# Patient Record
Sex: MALE | Race: WHITE | Employment: UNEMPLOYED | URBAN - METROPOLITAN AREA
[De-identification: names, ages, dates, MRNs, and addresses within clinical notes are randomized per-mention and may not be internally consistent; named-entity substitution may affect disease eponyms.]

---

## 2021-04-07 ENCOUNTER — HOSPITAL ENCOUNTER (INPATIENT)
Age: 26
LOS: 8 days | Discharge: HOME | DRG: 753 | End: 2021-04-16
Attending: EMERGENCY MEDICINE | Admitting: PSYCHIATRY & NEUROLOGY
Payer: MEDICAID

## 2021-04-07 DIAGNOSIS — R45.851 SUICIDAL IDEATION: Primary | ICD-10-CM

## 2021-04-07 PROCEDURE — 80179 DRUG ASSAY SALICYLATE: CPT

## 2021-04-07 PROCEDURE — 81001 URINALYSIS AUTO W/SCOPE: CPT

## 2021-04-07 PROCEDURE — 80143 DRUG ASSAY ACETAMINOPHEN: CPT

## 2021-04-07 PROCEDURE — 82077 ASSAY SPEC XCP UR&BREATH IA: CPT

## 2021-04-07 PROCEDURE — 80307 DRUG TEST PRSMV CHEM ANLYZR: CPT

## 2021-04-07 PROCEDURE — 80053 COMPREHEN METABOLIC PANEL: CPT

## 2021-04-07 PROCEDURE — 36415 COLL VENOUS BLD VENIPUNCTURE: CPT

## 2021-04-07 PROCEDURE — 85025 COMPLETE CBC W/AUTO DIFF WBC: CPT

## 2021-04-07 PROCEDURE — 99284 EMERGENCY DEPT VISIT MOD MDM: CPT

## 2021-04-07 NOTE — Clinical Note
Status[de-identified] IP BEHAVIORAL MEDICINE [112]   Type of Bed: Behavioral Health Acute [27]   Inpatient Hospitalization Certified Necessary for the Following Reasons: 3. Patient receiving treatment that can only be provided in an inpatient setting (further clarification in H&P documentation)   Admitting Diagnosis: Suicidal ideation [V62.84. ICD-9-CM]   Admitting Physician: Patel Valdez [[id number]]   Attending Physician: Patel Valdez [[id number]]   Estimated Length of Stay: 3-4 Midnights   Discharge Plan[de-identified] Home with Office Follow-up

## 2021-04-08 PROBLEM — F41.8 DEPRESSION WITH ANXIETY: Status: ACTIVE | Noted: 2021-04-08

## 2021-04-08 PROBLEM — R45.851 SUICIDAL IDEATION: Status: ACTIVE | Noted: 2021-04-08

## 2021-04-08 LAB
ALBUMIN SERPL-MCNC: 4.3 G/DL (ref 3.5–5)
ALBUMIN/GLOB SERPL: 1 {RATIO} (ref 1.1–2.2)
ALP SERPL-CCNC: 103 U/L (ref 45–117)
ALT SERPL-CCNC: 48 U/L (ref 12–78)
AMPHET UR QL SCN: NEGATIVE
ANION GAP SERPL CALC-SCNC: 7 MMOL/L (ref 5–15)
APAP SERPL-MCNC: <10 UG/ML (ref 10–30)
APPEARANCE UR: CLEAR
AST SERPL W P-5'-P-CCNC: 18 U/L (ref 15–37)
BACTERIA URNS QL MICRO: NEGATIVE /HPF
BARBITURATES UR QL SCN: NEGATIVE
BASOPHILS # BLD: 0.1 K/UL (ref 0–0.1)
BASOPHILS NFR BLD: 1 % (ref 0–1)
BENZODIAZ UR QL: NEGATIVE
BILIRUB SERPL-MCNC: 0.3 MG/DL (ref 0.2–1)
BILIRUB UR QL: NEGATIVE
BUN SERPL-MCNC: 18 MG/DL (ref 6–20)
BUN/CREAT SERPL: 17 (ref 12–20)
CA-I BLD-MCNC: 9.5 MG/DL (ref 8.5–10.1)
CANNABINOIDS UR QL SCN: NEGATIVE
CHLORIDE SERPL-SCNC: 103 MMOL/L (ref 97–108)
CO2 SERPL-SCNC: 27 MMOL/L (ref 21–32)
COCAINE UR QL SCN: NEGATIVE
COLOR UR: YELLOW
CREAT SERPL-MCNC: 1.06 MG/DL (ref 0.7–1.3)
DATE LAST DOSE: ABNORMAL
DATE LAST DOSE: ABNORMAL
DIFFERENTIAL METHOD BLD: ABNORMAL
DRUG SCRN COMMENT,DRGCM: NORMAL
EOSINOPHIL # BLD: 0.3 K/UL (ref 0–0.4)
EOSINOPHIL NFR BLD: 4 % (ref 0–7)
ERYTHROCYTE [DISTWIDTH] IN BLOOD BY AUTOMATED COUNT: 12.7 % (ref 11.5–14.5)
ETHANOL SERPL-MCNC: <4 MG/DL
GLOBULIN SER CALC-MCNC: 4.1 G/DL (ref 2–4)
GLUCOSE SERPL-MCNC: 101 MG/DL (ref 65–100)
GLUCOSE UR STRIP.AUTO-MCNC: NEGATIVE MG/DL
HCT VFR BLD AUTO: 42.2 % (ref 36.6–50.3)
HGB BLD-MCNC: 14.6 G/DL (ref 12.1–17)
HGB UR QL STRIP: NEGATIVE
IMM GRANULOCYTES # BLD AUTO: 0.1 K/UL (ref 0–0.04)
IMM GRANULOCYTES NFR BLD AUTO: 1 % (ref 0–0.5)
KETONES UR QL STRIP.AUTO: NEGATIVE MG/DL
LEUKOCYTE ESTERASE UR QL STRIP.AUTO: NEGATIVE
LYMPHOCYTES # BLD: 2.5 K/UL (ref 0.8–3.5)
LYMPHOCYTES NFR BLD: 32 % (ref 12–49)
MCH RBC QN AUTO: 31.1 PG (ref 26–34)
MCHC RBC AUTO-ENTMCNC: 34.6 G/DL (ref 30–36.5)
MCV RBC AUTO: 90 FL (ref 80–99)
METHADONE UR QL: NEGATIVE
MONOCYTES # BLD: 0.5 K/UL (ref 0–1)
MONOCYTES NFR BLD: 7 % (ref 5–13)
MUCOUS THREADS URNS QL MICRO: ABNORMAL /LPF
NEUTS SEG # BLD: 4.3 K/UL (ref 1.8–8)
NEUTS SEG NFR BLD: 55 % (ref 32–75)
NITRITE UR QL STRIP.AUTO: NEGATIVE
OPIATES UR QL: NEGATIVE
PCP UR QL: NEGATIVE
PH UR STRIP: 5 [PH] (ref 5–8)
PLATELET # BLD AUTO: 270 K/UL (ref 150–400)
PMV BLD AUTO: 9.9 FL (ref 8.9–12.9)
POTASSIUM SERPL-SCNC: 3.8 MMOL/L (ref 3.5–5.1)
PROT SERPL-MCNC: 8.4 G/DL (ref 6.4–8.2)
PROT UR STRIP-MCNC: NEGATIVE MG/DL
RBC # BLD AUTO: 4.69 M/UL (ref 4.1–5.7)
RBC #/AREA URNS HPF: ABNORMAL /HPF (ref 0–5)
REPORTED DOSE,DOSE: ABNORMAL UNITS
REPORTED DOSE,DOSE: ABNORMAL UNITS
SALICYLATES SERPL-MCNC: <1.7 MG/DL (ref 2.8–20)
SARS-COV-2, COV2: NORMAL
SARS-COV-2, COV2: NOT DETECTED
SODIUM SERPL-SCNC: 137 MMOL/L (ref 136–145)
SP GR UR REFRACTOMETRY: 1.02 (ref 1–1.03)
UA: UC IF INDICATED,UAUC: ABNORMAL
UROBILINOGEN UR QL STRIP.AUTO: 0.1 EU/DL (ref 0.1–1)
WBC # BLD AUTO: 7.7 K/UL (ref 4.1–11.1)
WBC URNS QL MICRO: ABNORMAL /HPF (ref 0–4)

## 2021-04-08 PROCEDURE — 87635 SARS-COV-2 COVID-19 AMP PRB: CPT

## 2021-04-08 PROCEDURE — 65220000003 HC RM SEMIPRIVATE PSYCH

## 2021-04-08 PROCEDURE — 74011250637 HC RX REV CODE- 250/637: Performed by: PSYCHIATRY & NEUROLOGY

## 2021-04-08 RX ORDER — BUSPIRONE HYDROCHLORIDE 7.5 MG/1
7.5 TABLET ORAL 2 TIMES DAILY
Status: ON HOLD | COMMUNITY
End: 2021-04-16 | Stop reason: SDUPTHER

## 2021-04-08 RX ORDER — ACETAMINOPHEN 325 MG/1
650 TABLET ORAL
Status: DISCONTINUED | OUTPATIENT
Start: 2021-04-08 | End: 2021-04-16 | Stop reason: HOSPADM

## 2021-04-08 RX ORDER — ADHESIVE BANDAGE
30 BANDAGE TOPICAL DAILY PRN
Status: CANCELLED | OUTPATIENT
Start: 2021-04-08

## 2021-04-08 RX ORDER — ACETAMINOPHEN 325 MG/1
650 TABLET ORAL
Status: CANCELLED | OUTPATIENT
Start: 2021-04-08

## 2021-04-08 RX ORDER — DIPHENHYDRAMINE HYDROCHLORIDE 50 MG/ML
50 INJECTION, SOLUTION INTRAMUSCULAR; INTRAVENOUS
Status: CANCELLED | OUTPATIENT
Start: 2021-04-08

## 2021-04-08 RX ORDER — ESCITALOPRAM OXALATE 20 MG/1
20 TABLET ORAL DAILY
Status: ON HOLD | COMMUNITY
End: 2021-04-16 | Stop reason: SDUPTHER

## 2021-04-08 RX ORDER — TRAZODONE HYDROCHLORIDE 50 MG/1
50 TABLET ORAL
Status: CANCELLED | OUTPATIENT
Start: 2021-04-08

## 2021-04-08 RX ORDER — TRAZODONE HYDROCHLORIDE 50 MG/1
50 TABLET ORAL
Status: ON HOLD | COMMUNITY
End: 2021-04-16 | Stop reason: SDUPTHER

## 2021-04-08 RX ORDER — MAG HYDROX/ALUMINUM HYD/SIMETH 200-200-20
30 SUSPENSION, ORAL (FINAL DOSE FORM) ORAL
Status: DISCONTINUED | OUTPATIENT
Start: 2021-04-08 | End: 2021-04-16 | Stop reason: HOSPADM

## 2021-04-08 RX ORDER — ARIPIPRAZOLE 2 MG/1
2 TABLET ORAL DAILY
COMMUNITY
End: 2021-04-16

## 2021-04-08 RX ORDER — ESCITALOPRAM OXALATE 10 MG/1
20 TABLET ORAL DAILY
Status: DISCONTINUED | OUTPATIENT
Start: 2021-04-08 | End: 2021-04-16 | Stop reason: HOSPADM

## 2021-04-08 RX ORDER — SODIUM CHLORIDE 9 MG/ML
75 INJECTION, SOLUTION INTRAVENOUS CONTINUOUS
Status: DISCONTINUED | OUTPATIENT
Start: 2021-04-08 | End: 2021-04-08

## 2021-04-08 RX ORDER — HYDROXYZINE 25 MG/1
25 TABLET, FILM COATED ORAL
Status: DISCONTINUED | OUTPATIENT
Start: 2021-04-08 | End: 2021-04-16 | Stop reason: HOSPADM

## 2021-04-08 RX ORDER — BENZTROPINE MESYLATE 1 MG/1
1 TABLET ORAL
Status: CANCELLED | OUTPATIENT
Start: 2021-04-08

## 2021-04-08 RX ORDER — LORAZEPAM 2 MG/ML
1 INJECTION INTRAMUSCULAR
Status: CANCELLED | OUTPATIENT
Start: 2021-04-08

## 2021-04-08 RX ORDER — FLUVOXAMINE MALEATE 50 MG/1
50 TABLET ORAL
Status: DISCONTINUED | OUTPATIENT
Start: 2021-04-08 | End: 2021-04-16 | Stop reason: HOSPADM

## 2021-04-08 RX ORDER — BUSPIRONE HYDROCHLORIDE 5 MG/1
7.5 TABLET ORAL 2 TIMES DAILY
Status: DISCONTINUED | OUTPATIENT
Start: 2021-04-08 | End: 2021-04-16 | Stop reason: HOSPADM

## 2021-04-08 RX ORDER — HYDROXYZINE 50 MG/1
50 TABLET, FILM COATED ORAL
Status: CANCELLED | OUTPATIENT
Start: 2021-04-08

## 2021-04-08 RX ORDER — HALOPERIDOL 5 MG/ML
5 INJECTION INTRAMUSCULAR
Status: CANCELLED | OUTPATIENT
Start: 2021-04-08

## 2021-04-08 RX ORDER — ARIPIPRAZOLE 5 MG/1
5 TABLET ORAL DAILY
Status: DISCONTINUED | OUTPATIENT
Start: 2021-04-08 | End: 2021-04-16 | Stop reason: HOSPADM

## 2021-04-08 RX ORDER — TRAZODONE HYDROCHLORIDE 50 MG/1
50 TABLET ORAL
Status: DISCONTINUED | OUTPATIENT
Start: 2021-04-08 | End: 2021-04-15

## 2021-04-08 RX ORDER — PRAZOSIN HYDROCHLORIDE 1 MG/1
1 CAPSULE ORAL
Status: DISCONTINUED | OUTPATIENT
Start: 2021-04-08 | End: 2021-04-16 | Stop reason: HOSPADM

## 2021-04-08 RX ORDER — OLANZAPINE 5 MG/1
5 TABLET ORAL
Status: CANCELLED | OUTPATIENT
Start: 2021-04-08

## 2021-04-08 RX ADMIN — BUSPIRONE HYDROCHLORIDE 7.5 MG: 5 TABLET ORAL at 11:12

## 2021-04-08 RX ADMIN — ARIPIPRAZOLE 5 MG: 5 TABLET ORAL at 09:00

## 2021-04-08 RX ADMIN — BUSPIRONE HYDROCHLORIDE 7.5 MG: 5 TABLET ORAL at 20:40

## 2021-04-08 RX ADMIN — ESCITALOPRAM OXALATE 20 MG: 10 TABLET ORAL at 11:12

## 2021-04-08 RX ADMIN — TRAZODONE HYDROCHLORIDE 50 MG: 50 TABLET ORAL at 20:40
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
## 2021-04-08 NOTE — GROUP NOTE
[facility] GROUP DOCUMENTATION INDIVIDUAL Group Therapy Note Date: 4/8/2021 Group Start Time: 1300 Group End Time: 1400 Group Topic: Process Group - Inpatient SRM CARE MANAGEMENT Anais Agarwal [facility] GROUP DOCUMENTATION GROUP Group Therapy Note Attendees: 4 out of 13 participants engaged in process group. Patient discussed feelings, emotions and triggers. Patients provided feedback to each other on how to cope and strategies coping skills to prepare for discharge. Attendance: Did not attend Additional Notes:  Patient decline to attend group. Patient was encouraged to discuss and processing feelings during next session. Yanci Mccloud

## 2021-04-08 NOTE — ED TRIAGE NOTES
Pt from private residence presents in [address] [place] [address] custody for mental health crisis. H/o PTSD, Major depression, and generalized anxiety, states he's been having Si with HI and aggression towards family pet, 911 called by family.

## 2021-04-08 NOTE — BH NOTES
Patient was medication compliant, pleasant upon approach, affect flat, poor eye contact. Patient rated depression at a 5 to 6 and anxiety at a 5 on a scale of 0-10. He endorsed SI and rated it at a 4. When questioned about SI he endorsed SI,  it was to \"physically prevent myself from hurting people. \"  His plan includes \"jumping off a bridge on to rocks,\" or \"taking a bunch of pills. \"  When asked about HI he said \"that's why I avoid people. \"  He said \"I don't feel safe at home. \"  AH: \"I hear my dad yelling my name. VH : \"like another part of me wants to deal with the problem. \"  Patient remains isolative. Patient continues on close observation, Q15 minute check.
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
## 2021-04-08 NOTE — GROUP NOTE
IP  GROUP DOCUMENTATION INDIVIDUAL Group Therapy Note Date: 4/8/2021 Group Start Time: 7192 Group End Time: 1878 Group Topic: Recreational/Music Therapy SRM 2  NON ACUTE [facility] GROUP DOCUMENTATION GROUP Group Therapy Note Facilitated leisure skills group focused on positive coping skills through social interactions, group activities, music and art tasks Attendees: 5/13 Attendance: Attended Patient's Goal:  *STG: Attends activities and groups Interventions/techniques: Art integration and Supported Follows Directions: Followed directions Interactions: Interacted appropriately Mental Status: Calm Behavior/appearance: Cooperative Goals Achieved: Able to engage in interactions and Able to listen to others Additional Notes:  Pt was receptive to music and songs he selected in group. Pt was seen interacting with peers and working on leisure packet. Tera Cadet RT Intern

## 2021-04-08 NOTE — PROGRESS NOTES
Problem: Suicide  Goal: *STG: Remains safe in hospital  Outcome: Progressing Towards Goal     Problem: Depressed Mood (Adult/Pediatric)  Goal: *STG: Verbalizes anger, guilt, and other feelings in a constructive manor  Outcome: Progressing Towards Goal  Goal: *STG: Remains safe in hospital  Outcome: Progressing Towards Goal

## 2021-04-08 NOTE — ED NOTES
TRANSFER - OUT REPORT:    Verbal report given to Diana Hernandez (name) on Giovanni Leigh  being transferred to  (unit) for routine progression of care       Report consisted of patients Situation, Background, Assessment and   Recommendations(SBAR). Information from the following report(s) SBAR, ED Summary, [facility] and Recent Results was reviewed with the receiving nurse. Lines:       Opportunity for questions and clarification was provided.       Patient transported with:   Registered Nurse    Security

## 2021-04-08 NOTE — ED NOTES
Pt resting comfortably in ED room 6 in green gown. Room is \"psych safed\" of strangulation hazards. 1:1 at bedside-[facility]. Pt clothing and 4 home medication bottles stored in ED station 1 dictation room with identification label on bag.

## 2021-04-08 NOTE — BH NOTES
[facility] Recreational Therapy Assessment    Orientation:  Person, Place, Date and Situation    Reason for Admission:  Pt reported \"SI and HI \"attacking a pet\" and feeling depressed and anxious\" Pt reported the biggest trigger was is family issues and  \"having a hostile interaction with his Aunt whom he was living with\" Pt reported Heather Brewer is not able to go back with her\"    Medical Precautions / Conditions:  None    Impairments:     Vision:  Wears Glasses Yes,\" but broke them\"      Wears Contacts No      Are they with Patient No     Hearing Aids No     Utilization of Ambulatory Devices:  None    Health Problems Preventing Participation in Activities:  No  How:  n/a    Leisure Interest Checklist:  Cards / Board Games, Listening to Music, Reading, Sports, TV / Movies, [employer], Walking and Writing    Does patient participate in leisure activities:  Sometimes    Setting:  Alone    Other Activities / Skills / Talents:  Pt reported \"detail work and good at working with professional people\"    Do emotions interfere with leisure activities / lifestyles:  Yes    When engaging in leisure activities, do you forget worries:  Yes    Do you belong to a Anglican:   Yes    Are you active in [facility] activities:  No    Typical Day:  Pt reported Heather Brewer is working on taking better care of himself, playing games on his phone and surfing the internet\"    Strengths:  Verbal Expression, Social Support and reported \"he was stable on medication for a week\"    Limitations / Barriers:  Family Support, Finances, Transportation, Housing, Depressed Mood, Anxiety and Anger / Aggression    Treatment Modalities:  Stress Management, Coping Skills, Symptom Recognition, Healthy Thinking, Mood Management and Leisure Skills    Patient Educational  Needs:  Skills to recognize and challenge problematic thinking, Identify positive coping strategies and skills to manage symptoms or moods, Leisure education and Recognition of symptoms and signs    Focus of Treatment: Introduce positive outlets for self expression and Introduce and encourage the exploration of alternative coping skills    Summary:  Pt was cooperative during assessment. Pt reported he was staying with his Aunt but is not able to go back there and so he is planning to go to a group home. Pt reported he is not working and only receives [employer]. Pt reported he see a Psychiatrist at [facility], see a Therapist and a  at [address] [place] [address] in [place]. Pt reported he also see a .  Pt reported his goal is \"to get stable on medication and have stable housing \"

## 2021-04-08 NOTE — GROUP NOTE
[facility] GROUP DOCUMENTATION INDIVIDUAL Group Therapy Note Date: 4/8/2021 Group Start Time: 1100 Group End Time: 3826 Group Topic: Education Group - Inpatient [facility] Group Grettariya Reyesarmida [facility] GROUP DOCUMENTATION GROUP Group Therapy Note Attendees: All pts were encouraged to attend but only 7 out of 12 attended. The topic was trauma and the common responses to it. The pts were given two worksheets and asked to introduce themselves as well as state something positive that has happened to them recently. They were then given a trigger warning and told to leave if they needed to. They were asked to give examples of trauma and their own feelings or reactions to a traumatic event. We went through the common reactions and treatment together and they were then asked to list healthy and unhealthy coping skills for trauma. In the end they were asked to reflect on the group and how they felt. Attendance: Did not attend Ky Smith
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
## 2021-04-09 PROCEDURE — 74011250637 HC RX REV CODE- 250/637: Performed by: PSYCHIATRY & NEUROLOGY

## 2021-04-09 PROCEDURE — 65220000003 HC RM SEMIPRIVATE PSYCH

## 2021-04-09 RX ADMIN — FLUVOXAMINE MALEATE 50 MG: 50 TABLET, COATED ORAL at 20:49

## 2021-04-09 RX ADMIN — ESCITALOPRAM OXALATE 20 MG: 10 TABLET ORAL at 08:44

## 2021-04-09 RX ADMIN — ARIPIPRAZOLE 5 MG: 5 TABLET ORAL at 08:44

## 2021-04-09 RX ADMIN — PRAZOSIN HYDROCHLORIDE 1 MG: 1 CAPSULE ORAL at 00:27

## 2021-04-09 RX ADMIN — BUSPIRONE HYDROCHLORIDE 7.5 MG: 5 TABLET ORAL at 20:47

## 2021-04-09 RX ADMIN — TRAZODONE HYDROCHLORIDE 50 MG: 50 TABLET ORAL at 20:47

## 2021-04-09 RX ADMIN — BUSPIRONE HYDROCHLORIDE 7.5 MG: 5 TABLET ORAL at 08:42

## 2021-04-09 RX ADMIN — FLUVOXAMINE MALEATE 50 MG: 50 TABLET, COATED ORAL at 00:27

## 2021-04-09 RX ADMIN — PRAZOSIN HYDROCHLORIDE 1 MG: 1 CAPSULE ORAL at 20:50
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
## 2021-04-09 NOTE — GROUP NOTE
[facility] GROUP DOCUMENTATION INDIVIDUAL Group Therapy Note Date: 4/9/2021 Group Start Time: 3660 Group End Time: 7448 Group Topic: Recreational/Music Therapy SRM 2  NON ACUTE Rupali Pederson [facility] GROUP DOCUMENTATION GROUP Group Therapy Note Facilitated leisure skills group focused on positive coping skills through social interactions, group activities, music and art tasks Attendees: 7/14 Attendance: Attended Patient's Goal:  *STG: Attends activities and groups Interventions/techniques: Art integration and Supported Follows Directions: Followed directions Interactions: Interacted appropriately Mental Status: Calm and Flat Behavior/appearance: Cooperative Goals Achieved: Able to engage in interactions and Able to listen to others Additional Notes:  Pt was receptive to music and song he selected while in group. Pt was seen working on leisure packet and interacting with peers. Antionette Sever, RT Intern
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
## 2021-04-09 NOTE — CONSULTS
Consult    Subjective:     Patient is a 26y.o. year old male PMH anxiety, depression, and PTSD voluntarily admitted for suicidal and homicidal ideations. Medical services consulted by psychiatry upon admission. The patient stopped taking his medications and was asked to leave by his family. He lashed out and strangled the dog. He has previously attempted suicide by overdose and self-harm gestures with knives. Admission labs were unremarkable and his drug screen was negative. He denies any chronic or acute medical problems. On exam he only complains of a slight headache after restarting his medications and is satisfied with Tylenol. Past Medical History:   Diagnosis Date    Anxiety with depression     Depression     Psychiatric disorder     PTSD      History reviewed. No pertinent surgical history. History reviewed. No pertinent family history.    Social History     Tobacco Use    Smoking status: Not on file   Substance Use Topics    Alcohol use: Not on file       Current Facility-Administered Medications   Medication Dose Route Frequency Provider Last Rate Last Admin    traZODone (DESYREL) tablet 50 mg  50 mg Oral QHS PRN Page Menendez MD   50 mg at 04/08/21 2040    acetaminophen (TYLENOL) tablet 650 mg  650 mg Oral Q4H PRN Tre Clinton MD        ARIPiprazole (ABILIFY) tablet 5 mg  5 mg Oral DAILY Page Menendez MD   5 mg at 04/09/21 0844    hydrOXYzine HCL (ATARAX) tablet 25 mg  25 mg Oral Q4H PRN Tre Clinton MD        alum-mag hydroxide-simeth (MYLANTA) oral suspension 30 mL  30 mL Oral Q4H PRN Jose Alfredo Clinton MD        busPIRone (BUSPAR) tablet 7.5 mg  7.5 mg Oral BID Page Menendez MD   7.5 mg at 04/09/21 0842    escitalopram oxalate (LEXAPRO) tablet 20 mg  20 mg Oral DAILY Page Menendez MD   20 mg at 04/09/21 0844    prazosin (MINIPRESS) capsule 1 mg  1 mg Oral QHS Jose Alfredo Clinton MD   1 mg at 04/09/21 0027    fluvoxaMINE (LUVOX) tablet 50 mg  50 mg Oral QHS Ysabel Clayton MD   50 mg at 04/09/21 9957        No Known Allergies     Review of Systems:  Constitutional: Negative for chills and fever. HENT: Negative. Eyes: Negative. Respiratory: Negative. Cardiovascular: Negative. Gastrointestinal: Negative for abdominal pain and nausea. Skin: Negative. Neurological: Negative. Objective: Intake and Output:    No intake/output data recorded. No intake/output data recorded. Physical Exam:   Constitutional: pt is oriented to person, place, and time. Head: Normocephalic and atraumatic. Eyes: Pupils are equal, round, and reactive to light. EOM are normal.   Cardiovascular: Normal rate, regular rhythm and normal heart sounds. Pulmonary/Chest: Breath sounds normal. No wheezes. No rales. Exhibits no tenderness. Abdominal: Soft. Bowel sounds are normal. There is no abdominal tenderness. There is no rebound and no guarding. Musculoskeletal: Normal range of motion. Neurological: pt is alert and oriented to person, place, and time. Normal strength. No cranial nerve deficit or sensory deficit. Data Review:   No results found for this or any previous visit (from the past 24 hour(s)). No orders to display        Assessment:     Active Problems:    Suicidal ideation (4/8/2021)      Depression with anxiety (4/8/2021)    Bipolar disorder - mixed  PTSD      Plan:   Admitted to [address] [place] [address]  On Abilify, buspar, lexapro, luvox, and minipress  Continue prn tylenol for headache  Continue prn medications for sleep and anxiety  Continue close observation and suicide precautions    Appreciate consultation. Medical services remain available as needed.     Discussed with Dr. William Shultz    No DVT PPX necessary as patient is ambulatory with no risk factors    Full code this admission

## 2021-04-09 NOTE — BH NOTES
Client vomited once during the shift and told me he has  Indigestion. Mylanta given along with a vistari. Client verbalized having some relief. Amaris Blount

## 2021-04-09 NOTE — BH NOTES
Patient isolative and flat, never came out of room, just sleeping. No distress noted, respirations unlabored. Will continue to monitor patient every 15 mins as per unit protocol.

## 2021-04-09 NOTE — BH NOTES
Client is quietly active on the unit. Appearance is semi-tidy. Client has a good appetite and reports sleeping well. Denies feeling suicidal or homicidal.Client did attend some of the morning groups. Takes meds as prescribed and denies any side effects. Remains on close observation for safety.

## 2021-04-09 NOTE — GROUP NOTE
[facility] GROUP DOCUMENTATION INDIVIDUAL Group Therapy Note Date: 4/9/2021 Group Start Time: 1100 Group End Time: 4935 Group Topic: Education Group - Inpatient [address] [facility] Tessie Meza [facility] GROUP DOCUMENTATION GROUP Group Therapy Note Attendees: All pts were encouraged to attend but only 10 out of 14 attended. The topic was mindfulness so the group did a 10 minute guided meditation. They were asked to introduce themselves and to state their current mood. They were then asked if they had meditated before and if they knew what mediation was. They were then asked if it was helpful in the past. After the mediation they were asked if it was helpful and if they had any questions about mindfulness and meditation. Attendance: Did not attend Ky Smith

## 2021-04-09 NOTE — BH NOTES
RELEASE OF INFORMATION    The patient signed a consent form for his aunt Michelle Torres to be contacted ([phone]).

## 2021-04-09 NOTE — GROUP NOTE
[facility]  GROUP DOCUMENTATION INDIVIDUAL Group Therapy Note Date: 4/9/2021 Group Start Time: 1300 Group End Time: 4286 Group Topic: Process Group - Inpatient [facility] Group Haydee GARRIDO  GROUP DOCUMENTATION GROUP Group Therapy Note Attendees: 4/14 Process: Pts were asked something they want to learn as a check in question. Pts were then encouraged to share what brought them to the hospital. Pts were encouraged to provide feedback to one another. Pts were then walked through a breathing exercise and asked to reflect on group Attendance: Did not attend Additional Notes:  Pt was encouraged to attend but chose not to do so [employer]

## 2021-04-10 PROCEDURE — 74011250637 HC RX REV CODE- 250/637: Performed by: PSYCHIATRY & NEUROLOGY

## 2021-04-10 PROCEDURE — 65220000003 HC RM SEMIPRIVATE PSYCH

## 2021-04-10 RX ADMIN — PRAZOSIN HYDROCHLORIDE 1 MG: 1 CAPSULE ORAL at 21:26

## 2021-04-10 RX ADMIN — ARIPIPRAZOLE 5 MG: 5 TABLET ORAL at 09:12

## 2021-04-10 RX ADMIN — FLUVOXAMINE MALEATE 50 MG: 50 TABLET, COATED ORAL at 21:27

## 2021-04-10 RX ADMIN — ESCITALOPRAM OXALATE 20 MG: 10 TABLET ORAL at 09:12

## 2021-04-10 RX ADMIN — BUSPIRONE HYDROCHLORIDE 7.5 MG: 5 TABLET ORAL at 09:12

## 2021-04-10 RX ADMIN — BUSPIRONE HYDROCHLORIDE 7.5 MG: 5 TABLET ORAL at 21:26

## 2021-04-10 NOTE — BH NOTES
Patient has been visible on the unit. Patient went to bed early to night. He medication compliant. His affect is flat, but he animated. Preoccupied. He denies depression, SI, HI, AH & VH, his anxiety is a 5/10. He remains on close observation for safety. No violent behavior observed.  Patient stated that he is staying in his room, to stay clam.

## 2021-04-10 NOTE — BH NOTES
Patient case discussed in the treatment team patient seen he says he slept well energy motivation improved no more suicidal thoughts no more homicidal thoughts no more thought to harm mom shooting etc.  Says medications working says he got some call from [employer] about his disability application come not made any aggressive moves he says he definitely needs some housing

## 2021-04-10 NOTE — GROUP NOTE
[facility]  GROUP DOCUMENTATION INDIVIDUAL Group Therapy Note Date: 4/10/2021 Group Start Time: 1922 Group End Time: 0992 Group Topic: Nursing SRM 2 [facility] Ann Shafer LPN IP  GROUP DOCUMENTATION GROUP Group Therapy Note Attendees: 5/14 Attendance: Did not attend Patient's Goal: id benefits of medications Interventions/techniques: Follows Directions:  
 
Interactions:  
 
Mental Status:  
 
Behavior/appearance:  
 
Goals Achieved: Additional Notes:  Pt. Invited did not attend Carol Bosch LPN

## 2021-04-10 NOTE — BH NOTES
Behavioral Health Treatment Team Note     Patient goal(s) for today: 'find out more about group homes'  Treatment team focus/goals: medication management, group therapy     Progress note: Pt presented with a flat affect and depressed mood. Pt stated that he felt better and that he had been sleeping a lot better. Pt stated that his anxiety was high due to not having his phone to play games that help him to calm down. Pt denies any SI/HI/AVH, stated depression is a 4/10 at this time. Pt was very flat and did not provide many answers. Pt stated that he was ready to go home. Pt in need of inpatient level of care to allow for dc planning. LOS:  2  Expected LOS: 5-7    Insurance info/prescription coverage:  [employer]  Date of last family contact:  4/9.21  Family requesting physician contact today:  no  Discharge plan:  Step down to a crisis stabilization program   Guns in the home:  no   Outpatient provider(s):  Sees a therapist and psychiatrist through [facility] [address]. Recently started seeing a .      Participating treatment team members: Grayson Mtz, * (assigned SW), Alicia Addison

## 2021-04-10 NOTE — PROGRESS NOTES
Problem: Suicide  Goal: *STG: Remains safe in hospital  Outcome: Progressing Towards Goal  Goal: *STG: Attends activities and groups  Outcome: Progressing Towards Goal  Goal: *STG/LTG: Complies with medication therapy  Outcome: Progressing Towards Goal     Problem: Depressed Mood (Adult/Pediatric)  Goal: *STG: Participates in treatment plan  Outcome: Progressing Towards Goal  Pt has isolated in his room resting in bed with eyes open most of AM, as the shift progressed pt appropriately socialized with select peers in the dayroom. Pt did attend some groups as scheduled. Pt denied thoughts of self harm, none noted. Pt without noted or noted anger issues. Pt accepted and tolerated meds as ordered. Will continue to monitor pt on closely and follow treatment plan. Pt encouraged to seek staff as needed. Pt requested and rec'd Mylanta 30cc po for indigestion at 1629, pt reported relief at 1725.

## 2021-04-10 NOTE — GROUP NOTE
[facility] GROUP DOCUMENTATION INDIVIDUAL Group Therapy Note Date: 4/10/2021 Group Start Time: 7852 Group End Time: 2917 Group Topic: Education Group - Inpatient SRM 2  NON ACUTE Germaine Kan 
 
[facility] GROUP DOCUMENTATION GROUP Group Therapy Note Facilitated discussion focused on identifying feelings and their triggers and the changes that need to be made to experience more comfortable feelings and less uncomfortable feelings Attendees: 7 out of 14 Attendance: Attended Patient's Goal:  *STG: Verbalizes anger, guilt, and other feelings in a constructive manor Interventions/techniques: Informed and Supported Follows Directions: Followed directions Interactions: Interacted appropriately Mental Status: Calm Behavior/appearance: Cooperative Goals Achieved: Able to engage in interactions, Able to listen to others, Able to self-disclose, Discussed coping, Identified feelings and Identified triggers Additional Notes:  Pt was receptive to information discussed and shared he experienced feeling anger, depressed  and calm and it's triggers was being yelled at, feeling disregarded and  financial stress. Pt reported he was feeling calm today because he made a friend, on stable meds, good sleep, and God/Rastafari. Pt reported having his own place,a good job, drivers license, a car, feeling safe outside and being able to build a lasting friendship and relationship will help him to experience more comfortable feelings ELIZABETH Fuentes

## 2021-04-10 NOTE — GROUP NOTE
[facility] GROUP DOCUMENTATION INDIVIDUAL Group Therapy Note Date: 4/10/2021 Group Start Time: 1100 Group End Time: 1520 Group Topic: Process Group - Inpatient SRM 2  NON ACUTE Glennelvi Dawson [facility] GROUP DOCUMENTATION GROUP Group Therapy Note Attendees: 10 out of 14 
 
11am Process Group Patients were invited to group and encouraged to share their thoughts, feelings, and emotions. Patients were asked to share their goal for the day with the group. Patients were also asked what they feel needs to change in their lives in order to improve their mental health. Lastly, the patients were asked to listen respectfully to and be supportive of their peers. Attendance: Attended Patient's Goal:  Develop coping skills Interventions/techniques: Validated, Promoted peer support, Provide feedback and Supported Follows Directions: Followed directions Interactions: Interacted appropriately Mental Status: Calm and Flat Behavior/appearance: Attentive, Cooperative and Motivated Goals Achieved: Able to engage in interactions, Able to listen to others, Able to reflect/comment on own behavior, Able to manage/cope with feelings, Able to receive feedback, Able to experience relief/decrease in symptoms, Able to self-disclose, Displayed empathy, Identified feelings, Identified triggers and Verbalized increased hopefulness Additional Notes:  Pt stated that his goal for today is \"staying on my medications and going to groups. \" Pt also spoke about needing to find a new place to live as he feels that living with family is negatively effecting his mental health. Pt stated that he might try to find housing in a group home or rooming house. Pt was calm and appropriate during group. [place]

## 2021-04-11 PROCEDURE — 74011250637 HC RX REV CODE- 250/637: Performed by: PSYCHIATRY & NEUROLOGY

## 2021-04-11 PROCEDURE — 65220000003 HC RM SEMIPRIVATE PSYCH

## 2021-04-11 RX ADMIN — FLUVOXAMINE MALEATE 50 MG: 50 TABLET, COATED ORAL at 21:38

## 2021-04-11 RX ADMIN — PRAZOSIN HYDROCHLORIDE 1 MG: 1 CAPSULE ORAL at 21:38

## 2021-04-11 RX ADMIN — TRAZODONE HYDROCHLORIDE 50 MG: 50 TABLET ORAL at 21:39

## 2021-04-11 RX ADMIN — BUSPIRONE HYDROCHLORIDE 7.5 MG: 5 TABLET ORAL at 21:38

## 2021-04-11 RX ADMIN — BUSPIRONE HYDROCHLORIDE 7.5 MG: 5 TABLET ORAL at 08:38

## 2021-04-11 RX ADMIN — ESCITALOPRAM OXALATE 20 MG: 10 TABLET ORAL at 08:39

## 2021-04-11 RX ADMIN — ARIPIPRAZOLE 5 MG: 5 TABLET ORAL at 08:39

## 2021-04-11 NOTE — BH NOTES
Pt up and out of room on time for breakfast.  Pt medication compliant and no negative effects from medications observed or reported. Affect observed as flat situational brightening on approach. Observed as calm and cooperative. Pt eating 100% of meals offered. No management issues on the unit. Pt reported no depression or suicidal ideation. Continue 15 minute checks to maintain pt safety.

## 2021-04-11 NOTE — GROUP NOTE
[facility] GROUP DOCUMENTATION INDIVIDUAL Group Therapy Note Date: 4/11/2021 Group Start Time: 1100 Group End Time: 1582 Group Topic: Process Group - Inpatient SRM 2 BH NON ACUTE Marietta Krishna [facility] GROUP DOCUMENTATION GROUP Group Therapy Note Attendees: 10 out of 14 Patients were invited to group and encouraged to share their thoughts, feelings, and emotions. Patients were asked to share their goals for the day with the group as well. Patients were also asked to speak about the coping skills that they have gained while hospitalized. Lastly, patients were encouraged to listen respectfully to and be supportive of their peers. Attendance: Did not attend Patient's Goal:  N/A Interventions/techniques: Other N/A Follows Directions: Other N/A Interactions: Other N/A Mental Status: Other N/A Behavior/appearance: N/A Goals Achieved: N/A Additional Notes:  Pt did not attend group despite having been encouraged to do so. [place]

## 2021-04-11 NOTE — BH NOTES
Patient has been visible on the unit, socializing with peers. He animated. No violate behavior observed. His depression, anxiety 3/10. He denies SI, HI, AH & VH. He is medication complaint. He remains on close observation for safety.

## 2021-04-11 NOTE — GROUP NOTE
[facility]  GROUP DOCUMENTATION INDIVIDUAL Group Therapy Note Date: 4/11/2021 Group Start Time: 1 Group End Time: 3098 Group Topic: Nursing SRM 2 BEHA TH ACUTE Ann Shafer LPN IP  GROUP DOCUMENTATION GROUP Group Therapy Note Attendees: 10/14 Attendance: Did not attend Patient's Goal: id facts & Myths about mental illness Interventions/techniques: Follows Directions:  
 
Interactions:  
 
Mental Status:  
 
Behavior/appearance:  
 
Goals Achieved: Additional Notes:  Pt. Invited did not attend Kaylene Jain LPN
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
## 2021-04-12 PROCEDURE — 65220000003 HC RM SEMIPRIVATE PSYCH

## 2021-04-12 PROCEDURE — 74011250637 HC RX REV CODE- 250/637: Performed by: PSYCHIATRY & NEUROLOGY

## 2021-04-12 RX ADMIN — FLUVOXAMINE MALEATE 50 MG: 50 TABLET, COATED ORAL at 21:12

## 2021-04-12 RX ADMIN — BUSPIRONE HYDROCHLORIDE 7.5 MG: 5 TABLET ORAL at 21:12

## 2021-04-12 RX ADMIN — BUSPIRONE HYDROCHLORIDE 7.5 MG: 5 TABLET ORAL at 08:30

## 2021-04-12 RX ADMIN — ESCITALOPRAM OXALATE 20 MG: 10 TABLET ORAL at 08:31

## 2021-04-12 RX ADMIN — PRAZOSIN HYDROCHLORIDE 1 MG: 1 CAPSULE ORAL at 21:12

## 2021-04-12 RX ADMIN — ARIPIPRAZOLE 5 MG: 5 TABLET ORAL at 08:30

## 2021-04-12 NOTE — BH NOTES
Patient pleasant upon approach, isolative, no peer interaction noted. Patient was medication compliant. Patient stated he was better than when he came in. He rated depression at a 2 and anxiety at a 3 on a scale of 0-10. Patient denied SI, HI, AH, and VH. Patient remains on close observation, Q 15 minute checks.

## 2021-04-12 NOTE — BH NOTES
Behavioral Health Treatment Team Note     Patient goal(s) for today: 'find out more information about going to group homes'  Treatment team focus/goals: dc planning, medication management, group therapy     Progress note: Pt presented with a flat affect and depressed mood. Pt enquired about group homes and stated that he feels that would be the best placement for him. Pt stated that he was 'about ready to leave' and that he felt that he was on the right medications. Pt stated that he was sleeping well and that he would like to continue taking the trazadone. Pt reported that his anxiety and depression were a 3/10. Pt denies any SI/HI/AVH at this time. Pt in need of inpatient level of care to allow time for service coordination. LOS:  4  Expected LOS: 5-7    Insurance info/prescription coverage:  [employer]  Date of last family contact:  4/9.21  Family requesting physician contact today:  no  Discharge plan:  Step down to a CSU   Guns in the home:  no   Outpatient provider(s):  Sees a therapist and psychiatrist through [facility] [address].  Recently started seeing a .   Participating treatment team members: Lidia Bowling, * (assigned SW), Merlene Lucas

## 2021-04-12 NOTE — PROGRESS NOTES
Discussed case interviewed patient  Taking and tolerating medications well  Decrease anxiety and depressive symptoms  Interacts with peers and staff  Attends groups  His mood remains depressed and flat  Denies suicidal ideations denies hearing voices  Prefers to keep to himself most times  No overt aggression  Eating and sleeping okay    Mental status exam  Alert oriented cooperative  Speech is goal-directed soft tone  Mood is depressed anxious  Thought process and illogical  Insight and judgment fair    Recommendations  Continue inpatient treatments  Medication regimen reviewed  Follow-up with psychiatric team again tomorrow
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
## 2021-04-12 NOTE — GROUP NOTE
[facility] GROUP DOCUMENTATION INDIVIDUAL Group Therapy Note Date: 4/12/2021 Group Start Time: 1100 Group End Time: 5714 Group Topic: Process Group - Inpatient SRM CARE MANAGEMENT Yuniel Haider LCSW [facility] GROUP DOCUMENTATION GROUP Group Therapy Note Pts participated in group therapy. Pts were asked a check in question about how they were feeling and what is something that they are looking forwards too. Pts then were asked to discuss and share thoughts feelings, and emotions that they may be feeling. Pts were encouraged to share with each other and provide feedback. Pts ended group by sharing one positive thing. Attendees: 13/15 Attendance: Attended Patient's Goal:  Pt working towards goal of attending group therapy Interventions/techniques: Validated, Promoted peer support and Provide feedback Follows Directions: Followed directions Interactions: Interacted appropriately Mental Status: Congruent Behavior/appearance: Attentive and Cooperative Goals Achieved: Able to engage in interactions, Able to listen to others, Able to give feedback to another and Able to reflect/comment on own behavior Additional Notes:  Pt participate in group therapy. Pt stated that he was excited about dc and ready to leave. Pt gave feedback to peers and discussed that he felt that he was put on the back burner in his family and that his needs were not met. Pt stated that one positive thing that had happened to him was that he was given his own space to retreat too when the unit was getting loud. Linda Cross LCSW

## 2021-04-12 NOTE — BH NOTES
The pt is resting quietly in bed with his eyes closed. No distress noted or voiced. Respirations remain quiet and unlabored. He remains on close observation with every 15 minute rounding for safety. At the start of the evening shift, the pt was sitting in the milieu. Pt noted quietly sitting, but not interacting with peers, while watching T.V.  He ambulated in the ocampo prior to going to sleep. The pt is med compliant and requested prn Trazodone for sleep. The pt stated that he did not sleep well the previous night since he did not get the Trazodone. The pt would like to have the Trazodone scheduled. He has a flat affect and rated his anxiety a 2/10 and his depression a 3/10. The pt denies having any SI, HI, or A/VH and no gestures noted. He is cooperative and pleasant. The pt is quiet and keeps to himself. He has  accepted snacks and something to drink. No c/o pain or discomfort. VSS.

## 2021-04-12 NOTE — PROGRESS NOTES
Problem: Falls - Risk of  Goal: *Absence of Falls  Description: Document Dandre Rutledge Fall Risk and appropriate interventions in the flowsheet. Outcome: Progressing Towards Goal  Note: Fall Risk Interventions:     Pt ambulates independently and without difficulty. No fall reported and none noted. Problem: Suicide  Goal: *STG: Remains safe in hospital  Outcome: Progressing Towards Goal     Pt remains safe while in the hospital. He denies having any suicidal thoughts and no gestures noted. Problem: Suicide  Goal: *STG/LTG: Complies with medication therapy  Outcome: Progressing Towards Goal      Pt is med compliant. Problem: Suicide  Goal: *STG/LTG: No longer expresses self destructive or suicidal thoughts  Outcome: Progressing Towards Goal   No self destructive or suicidal thoughts voiced and no gestures noted. Problem: Anxiety  Goal: *Alleviation of anxiety  Outcome: Progressing Towards Goal   Pt rated his anxiety a 2/10. He appears quiet / calm and cooperative.

## 2021-04-13 PROCEDURE — 74011250637 HC RX REV CODE- 250/637: Performed by: PSYCHIATRY & NEUROLOGY

## 2021-04-13 PROCEDURE — 65220000003 HC RM SEMIPRIVATE PSYCH

## 2021-04-13 RX ADMIN — ARIPIPRAZOLE 5 MG: 5 TABLET ORAL at 08:37

## 2021-04-13 RX ADMIN — TRAZODONE HYDROCHLORIDE 50 MG: 50 TABLET ORAL at 21:02

## 2021-04-13 RX ADMIN — FLUVOXAMINE MALEATE 50 MG: 50 TABLET, COATED ORAL at 21:01

## 2021-04-13 RX ADMIN — ESCITALOPRAM OXALATE 20 MG: 10 TABLET ORAL at 08:37

## 2021-04-13 RX ADMIN — BUSPIRONE HYDROCHLORIDE 7.5 MG: 5 TABLET ORAL at 08:37

## 2021-04-13 RX ADMIN — PRAZOSIN HYDROCHLORIDE 1 MG: 1 CAPSULE ORAL at 21:02

## 2021-04-13 RX ADMIN — BUSPIRONE HYDROCHLORIDE 7.5 MG: 5 TABLET ORAL at 21:01

## 2021-04-13 NOTE — GROUP NOTE
HEMA  GROUP DOCUMENTATION INDIVIDUAL Group Therapy Note Date: 4/13/2021 Group Start Time: 0284 Group End Time: 1930 Group Topic: Special Track - Drug Topic SRM 2 BEHA HLTH ACUTE Nguyen Mariajose, RT 
 
[facility] GROUP DOCUMENTATION GROUP Group Therapy Note Attendees: 10 [employer] Attendance: Attended Patient's Goal:  To attend groups Interventions/techniques: Informed Follows Directions: Followed directions Interactions: Interacted appropriately Mental Status: Calm Behavior/appearance: Attentive and Cooperative Goals Achieved: Able to listen to others and Able to receive feedback Additional Notes:  Pt attended group and was engaged.  
 
Rocky New, RT

## 2021-04-13 NOTE — BH NOTES
Patient case discussed in the treatment team patient seen patient is in bed calm polite pleasant oriented to 3 spheres and denied suicidal thought denied homicidal thought and he is very much preoccupied with the housing group home and getting Social Security but he did talk to his aunt but felt that she told him that she cannot afford to have him home. Affect remains flat no agitation no aggression.   Compliant with medication and attending the groups continued inpatient level of care indicated vital signs today temperature 97.8 respiration 18 pulse 80 blood pressure 106/64 and this is a progress note for April 12, 2021 thank you

## 2021-04-13 NOTE — GROUP NOTE
IP  GROUP DOCUMENTATION INDIVIDUAL Group Therapy Note Date: 4/12/2021 Group Start Time: 2015 Group End Time: 2100 Group Topic: Recreational/Music Therapy SRM 2  NON ACUTE Rubia Fermin [facility] GROUP DOCUMENTATION GROUP Group Therapy Note Attendees: 10 Introduced healthy leisure task as positive way to cope and manage mood Attendance: Attended Patient's Goal:STG: Attends activities and groups Interventions/techniques: Art integration and Supported Follows Directions: Followed directions Interactions: Interacted appropriately Mental Status: Calm and Flat Behavior/appearance: Attentive and Cooperative Goals Achieved: Able to engage in interactions and Able to listen to others Additional Notes: Attended group and actively participated. Selected songs to listen to in group. Pt received art task and puzzles to complete. Was receptive to intervention. Used leisure time constructively.  
 
Dinesh Ramey, CTRS
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
## 2021-04-13 NOTE — BH NOTES
Patient ambulating in the halls at times social with peers. Affect flat to constricted with minimal eye contact. He was medication compliant. 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 He assured writer that the case management was aware. Writer spoke with  and he is to have a full psychological evaluation by Dr. Stefani Barron. Patient denied SI, HI, AH, VH, depression and anxiety. He remains on close observation, Q 15 minute checks.

## 2021-04-13 NOTE — GROUP NOTE
[facility] GROUP DOCUMENTATION INDIVIDUAL Group Therapy Note Date: 4/13/2021 Group Start Time: 1300 Group End Time: 3683 Group Topic: Process Group - Inpatient [facility] Group Flores Montes [facility] GROUP DOCUMENTATION GROUP Group Therapy Note Attendees: All pts were encouraged to attend but only 8 out of 15 attended. We used to positivity ball to create a conversation around remaining positive. The members passed around the ball and either answered a question or read a statement and commented on it. In the end the members were asked if they enjoyed the activity. Attendance: Attended Patient's Goal:  To attend groups and activities Interventions/techniques: Challenged, Informed, Validated, Promoted peer support, Provide feedback and Supported Follows Directions: Followed directions Interactions: Interacted appropriately Mental Status: Calm and Congruent Behavior/appearance: Attentive, Cooperative and Motivated Goals Achieved: Able to engage in interactions, Able to listen to others, Able to reflect/comment on own behavior, Able to receive feedback, Able to self-disclose and Identified feelings Additional Notes:  Pt participated often. He reported feeling \"pretty good\" and rated his mood an 8 out of 10. He spoke about being a perfectionist and controlling his thoughts. He also spoke about a positive song he enjoys and what is important to him. Mauri Sandoval

## 2021-04-14 PROCEDURE — 74011250637 HC RX REV CODE- 250/637: Performed by: PSYCHIATRY & NEUROLOGY

## 2021-04-14 PROCEDURE — 65220000003 HC RM SEMIPRIVATE PSYCH

## 2021-04-14 RX ORDER — ARIPIPRAZOLE 400 MG
400 KIT INTRAMUSCULAR ONCE
Status: COMPLETED | OUTPATIENT
Start: 2021-04-15 | End: 2021-04-15

## 2021-04-14 RX ADMIN — TRAZODONE HYDROCHLORIDE 50 MG: 50 TABLET ORAL at 21:01

## 2021-04-14 RX ADMIN — PRAZOSIN HYDROCHLORIDE 1 MG: 1 CAPSULE ORAL at 21:01

## 2021-04-14 RX ADMIN — HYDROXYZINE HYDROCHLORIDE 25 MG: 25 TABLET, FILM COATED ORAL at 20:24

## 2021-04-14 RX ADMIN — BUSPIRONE HYDROCHLORIDE 7.5 MG: 5 TABLET ORAL at 08:29

## 2021-04-14 RX ADMIN — ESCITALOPRAM OXALATE 20 MG: 10 TABLET ORAL at 08:29

## 2021-04-14 RX ADMIN — ARIPIPRAZOLE 5 MG: 5 TABLET ORAL at 08:29

## 2021-04-14 RX ADMIN — BUSPIRONE HYDROCHLORIDE 7.5 MG: 5 TABLET ORAL at 21:01

## 2021-04-14 RX ADMIN — FLUVOXAMINE MALEATE 50 MG: 50 TABLET, COATED ORAL at 21:01

## 2021-04-14 NOTE — GROUP NOTE
IP  GROUP DOCUMENTATION INDIVIDUAL Group Therapy Note Date: 4/14/2021 Group Start Time: 0683 Group End Time: 2983 Group Topic: Special Track - Drug Topic SRM 2 BEHA HLTH ACUTE Shana Martinez, RT 
 
[facility] GROUP DOCUMENTATION GROUP Group Therapy Note Recovery and Relapse Attendees: 8 Attendance: Attended Patient's Goal:  To attend groups Interventions/techniques: Informed Follows Directions: Followed directions Interactions: Interacted appropriately Mental Status: Calm Behavior/appearance: Attentive, Cooperative and Motivated Goals Achieved: Able to engage in interactions, Able to listen to others, Able to give feedback to another, Able to reflect/comment on own behavior and Able to receive feedback Additional Notes:  Pt attended group and participated in group discussion.  
 
Ascencion Patricia, RT

## 2021-04-14 NOTE — BH NOTES
Behavioral Health Treatment Team Note     Patient goal(s) for today: 'find out what is going on with my discharge planning'  Treatment team focus/goals: continue group therapy, medication management and work on discharge planning    Progress note: Pt presented with a flat affect and congruent mood. Pt said that they were wondering what happened with the psych evaluation. Pt reported that the doctor never saw him last night. Pt denied SI/HI/AVH and depression and anxiety at this time. Pt said that they are hoping to work with their Jehovah's witness to find somewhere to live and get a job. Pt said they want to save up money so that they can go to school and study engineering.  Pt still needs an inpatient level of treatment to allow for dc planning    LOS:  6  Expected LOS: 5-7 days    Insurance info/prescription coverage:  [employer]  Date of last family contact:  4.9.21  Family requesting physician contact today:  no  Discharge plan:  Step down to CSU  Guns in the home:  no   Outpatient provider(s):  [facility] psychiatrist and therapist    Participating treatment team members: Hien Merida, * (assigned SW)CUCO

## 2021-04-14 NOTE — GROUP NOTE
HEMA  GROUP DOCUMENTATION INDIVIDUAL Group Therapy Note Date: 4/14/2021 Group Start Time: 7722 Group End Time: 1100 Group Topic: Nursing SRM 2 BEHA [facility] Ann Shafer LPN IP  GROUP DOCUMENTATION GROUP Group Therapy Note Attendees: 8/14 Attendance: Did not attend Patient's Goal: Id benefits of medications Interventions/techniques: Follows Directions:  
 
Interactions:  
 
Mental Status:  
 
Behavior/appearance:  
 
Goals Achieved: Additional Notes: Pt. Was not on the unit @ the time of the group Elham Negrete LPN

## 2021-04-14 NOTE — PROGRESS NOTES
Problem: Falls - Risk of  Goal: *Absence of Falls  Description: Document Longoria Maple Fall Risk and appropriate interventions in the flowsheet.   Outcome: Progressing Towards Goal  Note: Fall Risk Interventions:                                Problem: Patient Education: Go to Patient Education Activity  Goal: Patient/Family Education  Outcome: Progressing Towards Goal     Problem: Suicide  Goal: *STG: Remains safe in hospital  Outcome: Progressing Towards Goal  Goal: *STG: Seeks staff when feelings of self harm or harm towards others arise  Outcome: Progressing Towards Goal  Goal: *STG: Attends activities and groups  Outcome: Progressing Towards Goal  Goal: *STG:  Verbalizes alternative ways of dealing with maladaptive feelings/behaviors  Outcome: Progressing Towards Goal  Goal: *STG/LTG: Complies with medication therapy  Outcome: Progressing Towards Goal  Goal: *STG/LTG: No longer expresses self destructive or suicidal thoughts  Outcome: Progressing Towards Goal  Goal: *LTG:  Identifies available community resources  Outcome: Progressing Towards Goal  Goal: *LTG:  Develops proactive suicide prevention plan  Outcome: Progressing Towards Goal  Goal: Interventions  Outcome: Progressing Towards Goal     Problem: Patient Education: Go to Patient Education Activity  Goal: Patient/Family Education  Outcome: Progressing Towards Goal     Problem: Depressed Mood (Adult/Pediatric)  Goal: *STG: Participates in treatment plan  Outcome: Progressing Towards Goal  Goal: *STG: Participates in 1:1 therapy sessions  Outcome: Progressing Towards Goal  Goal: *STG: Verbalizes anger, guilt, and other feelings in a constructive manor  Outcome: Progressing Towards Goal  Goal: *STG: Attends activities and groups  Outcome: Progressing Towards Goal  Goal: *STG: Demonstrates reduction in symptoms and increase in insight into coping skills/future focused  Outcome: Progressing Towards Goal  Goal: *STG: Remains safe in hospital  Outcome: Progressing Towards Goal  Goal: *STG: Complies with medication therapy  Outcome: Progressing Towards Goal  Goal: *LTG: Returns to previous level of functioning and participates with after care plan  Outcome: Progressing Towards Goal  Goal: *LTG: Understands illness and can identify signs of relapse  Outcome: Progressing Towards Goal  Goal: Interventions  Outcome: Progressing Towards Goal     Problem: Patient Education: Go to Patient Education Activity  Goal: Patient/Family Education  Outcome: Progressing Towards Goal     Problem: Anxiety  Goal: *Alleviation of anxiety  Outcome: Progressing Towards Goal  Goal: *Alleviation of anxiety (Palliative Care)  Outcome: Progressing Towards Goal     Problem: Patient Education: Go to Patient Education Activity  Goal: Patient/Family Education  Outcome: Progressing Towards Goal

## 2021-04-14 NOTE — PROGRESS NOTES
Patient alert and oriented x4. Patient denies si/hi/avh. Patient denies anxiety and depression. Patient in milieu interacting with peers and attending groups. Patient has a flat affect and pleasant mood. Patient is medication compliant, calm and cooperative at this time. Will continue to monitor.

## 2021-04-14 NOTE — BH NOTES
Patient presents calm & cooperative. Ate 100% of breakfast in the dayroom with peers. Social with select peers. Medication compliant. No side effects noted. Denies SI/HI. Denies AVH. Reports that he slept well last night. No physical complaints voiced. Patient moved to 244 front side. Attends groups. Remains on CO. Continue to monitor.

## 2021-04-14 NOTE — BH NOTES
Patient case discussed in the treatment team patient seen and patient in bed does go to groups no agitation no aggression feedback from the aunt he cannot go back there the selene [facility] is a manipulative and poor in compliance

## 2021-04-14 NOTE — BH NOTES
PROGRESS NOTE    The therapist met with the patient about the psychological evaluation she was trying to schedule him through [facility]. She told him that [email or web address] said that he could not evaluate the patient because his speciality is just diagnosing people who meet criteria for an intellectual disability. The therapist also relayed to the patient that [facility] felt like he would benefit more from a forensic psych eval due to his violent intrusive thoughts, and homicidal ideations. The patient denied that he was experiencing those anymore. He also inquired about discharge and said that he would like to leave by the end of the week or Monday.  The therapist told him that she would schedule a family session with his aunt, and connect him to a crisis stabilization program.

## 2021-04-14 NOTE — GROUP NOTE
[facility] GROUP DOCUMENTATION INDIVIDUAL Group Therapy Note Date: 4/14/2021 Group Start Time: 1100 Group End Time: 2998 Group Topic: Education Group - Inpatient [facility] Group Lionel Juárez [facility] GROUP DOCUMENTATION GROUP Group Therapy Note Attendees: All pts were encouraged to attend but only 11 out of 14 attended. The topic today was safety planning. The pts were asked to introduce themselves and to state something positive that has happened to them recently. We then went through the safety plan as a group and some members shared and supported their peers. The pts were instructed to complete it in full and be detailed and to return it to facilitator. In the end they were asked how they felt about creating a safety plan and most responded well. Attendance: Attended Patient's Goal:  To attend activities and groups Interventions/techniques: Challenged, Informed, Validated, Promoted peer support, Provide feedback and Supported Follows Directions: Followed directions Interactions: Interacted appropriately Mental Status: Calm, Congruent and Flat Behavior/appearance: Attentive, Cooperative and Motivated Goals Achieved: Able to engage in interactions, Able to listen to others, Able to reflect/comment on own behavior, Able to self-disclose, Identified feelings, Increased hopefulness and Verbalized increased hopefulness Additional Notes:  Pt participated often and completed safety plan. He identified getting medicine to help him sleep better as a recent positive event. He reported that he tends to reflect on the past too much and criticize himself too much. He also reported agitation and hand twitch as warning signs. He reported painting, music, walking, and reading and writing as coping skills. He reported his  and family members as supports.  He reported that he needed to remove alcohol in all areas and add cooking utensils to eat better to make his environment safe. Reji Hayward

## 2021-04-14 NOTE — GROUP NOTE
IP  GROUP DOCUMENTATION INDIVIDUAL Group Therapy Note Date: 4/14/2021 Group Start Time: 1400 Group End Time: 1500 Group Topic: Special Track - Drug Topic SRM 2 BEHA HLTH ACUTE Karishma Kearney, RT 
 
[facility] GROUP DOCUMENTATION GROUP Group Therapy Note Stages of Change Attendees: 10 Attendance: Attended Patient's Goal:  To attend groups Interventions/techniques: Informed Follows Directions: Followed directions Interactions: Interacted appropriately Mental Status: Calm Behavior/appearance: Attentive and Cooperative Goals Achieved: Able to engage in interactions, Able to listen to others, Able to reflect/comment on own behavior and Able to receive feedback Additional Notes:  Pt attended group and participated in group discussion on change.  
 
Elfego Chaudhary, RT

## 2021-04-14 NOTE — GROUP NOTE
[facility] GROUP DOCUMENTATION INDIVIDUAL Group Therapy Note Date: 4/14/2021 Group Start Time: 1300 Group End Time: 7803 Group Topic: Process Group - Inpatient [facility] GROUP DOCUMENTATION GROUP Group Therapy Note Attendees: 10 Patients were asked to share about their feelings throughout their day and to discuss coping skills with their peers. Attendance: Attended Patient's Goal: Attends activities and groups. Interventions/techniques: Validated, Promoted peer support, Provide feedback, Reinforced and Supported Follows Directions: Followed directions Interactions: Interacted appropriately Mental Status: Calm and Flat Behavior/appearance: Attentive and Cooperative Goals Achieved: Able to listen to others, Able to self-disclose, Discussed coping and Identified feelings Additional Notes:  Pt interacted appropriately throughout the group. Pt shared that he often has difficulty with coping with situations in which he does not have control. Pt shared his spirituality is a powerful coping tool for him when he is experiencing SI. Pt continues to work toward his goal of attending groups. GLORIA Cutler Intern

## 2021-04-15 PROCEDURE — 74011250636 HC RX REV CODE- 250/636: Performed by: PSYCHIATRY & NEUROLOGY

## 2021-04-15 PROCEDURE — 74011250637 HC RX REV CODE- 250/637: Performed by: PSYCHIATRY & NEUROLOGY

## 2021-04-15 PROCEDURE — 65220000003 HC RM SEMIPRIVATE PSYCH

## 2021-04-15 RX ORDER — TRAZODONE HYDROCHLORIDE 50 MG/1
100 TABLET ORAL
Status: DISCONTINUED | OUTPATIENT
Start: 2021-04-15 | End: 2021-04-16 | Stop reason: HOSPADM

## 2021-04-15 RX ADMIN — ALUMINUM HYDROXIDE, MAGNESIUM HYDROXIDE, AND SIMETHICONE 30 ML: 200; 200; 20 SUSPENSION ORAL at 06:38

## 2021-04-15 RX ADMIN — PRAZOSIN HYDROCHLORIDE 1 MG: 1 CAPSULE ORAL at 21:11

## 2021-04-15 RX ADMIN — ARIPIPRAZOLE 400 MG: KIT at 10:54

## 2021-04-15 RX ADMIN — ESCITALOPRAM OXALATE 20 MG: 10 TABLET ORAL at 08:36

## 2021-04-15 RX ADMIN — BUSPIRONE HYDROCHLORIDE 7.5 MG: 5 TABLET ORAL at 21:11

## 2021-04-15 RX ADMIN — BUSPIRONE HYDROCHLORIDE 7.5 MG: 5 TABLET ORAL at 08:36

## 2021-04-15 RX ADMIN — TRAZODONE HYDROCHLORIDE 100 MG: 50 TABLET ORAL at 21:13

## 2021-04-15 RX ADMIN — HYDROXYZINE HYDROCHLORIDE 25 MG: 25 TABLET, FILM COATED ORAL at 11:04

## 2021-04-15 RX ADMIN — ALUMINUM HYDROXIDE, MAGNESIUM HYDROXIDE, AND SIMETHICONE 30 ML: 200; 200; 20 SUSPENSION ORAL at 00:49

## 2021-04-15 RX ADMIN — FLUVOXAMINE MALEATE 50 MG: 50 TABLET, COATED ORAL at 21:11

## 2021-04-15 RX ADMIN — ARIPIPRAZOLE 5 MG: 5 TABLET ORAL at 08:35

## 2021-04-15 NOTE — GROUP NOTE
IP  GROUP DOCUMENTATION INDIVIDUAL Group Therapy Note Date: 4/15/2021 Group Start Time: 0839 Group End Time: 5999 Group Topic: Special Track - Drug Topic SRM 2 [facility]HA [facility] ACUTE Claudia Lake, RT 
 
[facility] GROUP DOCUMENTATION GROUP Group Therapy Note Virtual NA Meeting Attendees: 7 Attendance: Attended Patient's Goal:  To attend groups Interventions/techniques: Informed Follows Directions: Followed directions Interactions: Interacted appropriately Mental Status: Calm Behavior/appearance: Attentive and Cooperative Goals Achieved: Able to listen to others Additional Notes:  Pt attended group and was engaged in the virtual NA meeting. Stated it was his first experience with an NA meeting before.  
 
Rosario Camejo, RT

## 2021-04-15 NOTE — BH NOTES
Patient case discussed in the treatment team this morning and at felt that he could benefit from going to the nonacute unit and the doing well adjusted no suicidal thoughts no homicidal thoughts no shooting any masturbating or anything and no agitation no aggression compliant with the medication even willing to try a long-acting Depo aripiprazole. I see progress note by Gypsy Soulier his therapist about the psychological testing also looking at crisis stabilization unit.   Patient compliant with medication his labs and studies vital signs temperature 98.1 pulse 90 blood pressure 121/80 respiratory 18 I will go ahead and start him on Abilify maintain tomorrow thank you end of dictation continued inpatient level of care indicated

## 2021-04-15 NOTE — GROUP NOTE
IP  GROUP DOCUMENTATION INDIVIDUAL Group Therapy Note Date: 4/14/2021 Group Start Time: 1900 Group End Time: 2000 Group Topic: Recreational/Music Therapy SRM 2  NON ACUTE Marherbere Scooter [facility] GROUP DOCUMENTATION GROUP Group Therapy Note Attendees: 6 Introduced healthy leisure task skill as positive way to cope and manage mood. Attendance: Attended Patient's Goal: STG: Attends activities and groups Interventions/techniques: Art integration and Supported Follows Directions: Followed directions Interactions: Interacted appropriately Mental Status: Calm and Flat Behavior/appearance: Attentive and Cooperative Goals Achieved: Able to engage in interactions and Able to listen to others Additional Notes: Attended group and actively participated. Was receptive to intervention. Selected songs to listen to in group. Used leisure time constructively.  
 
Lashawn Romero, CTRS

## 2021-04-15 NOTE — GROUP NOTE
[facility] GROUP DOCUMENTATION INDIVIDUAL Group Therapy Note Date: 4/15/2021 Group Start Time: 1100 Group End Time: 0879 Group Topic: Education Group - Inpatient [facility] Group Lita Cortez [facility] GROUP DOCUMENTATION GROUP Group Therapy Note Attendees: All pts were encouraged to come but only 9 out of 13 pts attended. The topic was trauma and the pts were given two worksheets revolving around trauma and common reactions to trauma. The pts were asked to read aloud and to comment on what they related to. The conversations revolved around trauma reactions and coping and how ppl expect you to just get over it as well as the importance of talking about it instead of ignoring it. They were given a trigger warning at the beginning and in the end it was offered to stick around for breathing exercises. Attendance: Attended Patient's Goal:  To attend groups and activities Interventions/techniques: Challenged, Informed, Validated, Promoted peer support, Provide feedback and Supported Follows Directions: Followed directions Interactions: Interacted appropriately Mental Status: Anxious, Calm and Congruent Behavior/appearance: Attentive and Cooperative Goals Achieved: Able to engage in interactions, Able to listen to others, Able to give feedback to another, Able to reflect/comment on own behavior, Able to self-disclose, Discussed coping, Identified feelings, Increased hopefulness and Verbalized increased hopefulness Additional Notes:  Pt participated often. He actually interrupted his peers often but w no bad intention. He came in a little late. He reported feeling anxious towards the end of the group just from talking about trauma. He talked about his experience with nightmares and flashbacks. He also talked about how he avoids places and people who remind him of his trauma.  He reported walking, doing pushups, and listening to music to cope. He also reported some risky behavior in response to trauma. Ember Fairchild

## 2021-04-15 NOTE — GROUP NOTE
HEMA  GROUP DOCUMENTATION INDIVIDUAL Group Therapy Note Date: 4/15/2021 Group Start Time: 1400 Group End Time: 1500 Group Topic: Special Track - Drug Topic SRM 2 BEHA [facility] ACUTE Aguilar Vaughn, RT 
 
[facility] GROUP DOCUMENTATION GROUP Group Therapy Note Addictive Agents Attendees: 8 Attendance: Did not attend Additional Notes:  Pt was invited to attend group but did not attend.  
 
Sb Phillips, RT

## 2021-04-15 NOTE — BH NOTES
FAMILY SESSION     The therapist facilitated a family session between the patient and his aunt Hugh Arce. The patient said that he came to the hospital for violent intrusive thoughts he was having, that he believes has stemmed from his trauma. His aunt was fairly frustrated with him, adding that he had lied about taking his medications and going to his appointments. When the therapist inquired why the patient might have been doing this, he said that he has a fear of taking medications. He said that he has had a bad experience with medications in the past, when he was in the [employer] and he had abruptly come off of them. The therapist helped him make the connection between him stopping his medications leads to feeling more depressed, versus when he takes them consistently he feels better. The aunt also brought up his \"severe porn addiction\". She said that it has caused problems in his relationships as well, adding that he had asked his sister inappropriate questions about what kind of sex worker she would be like in porn. They spoke about how porn can dwarf peoples expectations of what realistic, healthy relationships look like, including appropriate boundaries. He appeared to feel ashamed during the conversation, as evidenced by bowing his head and not making eye contact. He said that it is difficult for him to reflect on topics such as these, although acknowledges that he has issues with watching porn and that he wants to reduce his viewing of it. The aunt confirmed that due to these behaviors he will not be able to return home with her. He spoke about his intended discharge plans with her.

## 2021-04-15 NOTE — BH NOTES
Behavioral Health Treatment Team Note     Patient goals: \"Make sure I get enough rest\"  Treatment team focus/goals: To continue group therapy and medication management. Progress note: The patient was presenting with a mostly flat affect and incongruent mood. He expressed feeling better and is ready to discharge within the next few days. He denied SI/HI and AH/Vh's. He said that he plans on staying with a female peer he has met while here once they are discharged. The therapist inquired about what their living arrangements would be and the status of their relationship. The patient said that they want to get a three bedroom apartment together. He also said that they are friends, although is something more develops than he would be okay with that. The therapist reminded him to make sure that everything within the relationship is consensual, and to respect peoples decisions and boundaries. He said that he would like to step down to a crisis stabilization first, and then potentially transition to another program that. See family session note for more information. An inpatient level of care is still necessary due to arranging appropriate discharge plans for the patient tomorrow.  He was accepted into [facility] crisis stabilization program.      LOS:  7  Expected LOS: 7-8 days     Insurance info/prescription coverage:  [facility] [employer] [facility]   Date of last family contact:  4/15/21  Family requesting physician contact today:  no  Discharge plan:  The patient will step down to [facility] crisis stabilization program.   Guns in the home:  no   Outpatient provider(s):  [facility], he sees a  and a therapist.     Participating treatment team members: Lily Anderson LMSW

## 2021-04-15 NOTE — BH NOTES
Patient c/o anxiety before bed and requested a vistaril prior to scheduled medications. Pt also woke up complaining of heart burn/indigestion so mylanta was given. Pt came back 30mins later saying it did not help much so a banana and ginger ale was given. Will notify physician this morning to get something else to help with the reflux. Currently, patient resting with no distress noted. Will continue to monitor pt every 15 mins as per unit protocol.

## 2021-04-15 NOTE — BH NOTES
PROGRESS NOTE    The therapist spoke to the patient about wanting to continue a relationship with a female peer who he has met during this admission on the unit. The therapist discouraged this relationship for several reasons. She also reminded him to respect people boundaries, and ensure that everything that they choose to do moving forward is consensual. He said that he understood this.

## 2021-04-15 NOTE — PROGRESS NOTES
Problem: Suicide  Goal: *STG: Remains safe in hospital  Outcome: Progressing Towards Goal  Goal: *STG: Attends activities and groups  Outcome: Progressing Towards Goal  Goal: *STG/LTG: Complies with medication therapy  Outcome: Progressing Towards Goal     Problem: Depressed Mood (Adult/Pediatric)  Goal: *STG: Remains safe in hospital  Outcome: Progressing Towards Goal  Goal: *STG: Complies with medication therapy  Outcome: Progressing Towards Goal  Goal: *LTG: Returns to previous level of functioning and participates with after care plan  Outcome: Progressing Towards Goal  Goal: *LTG: Understands illness and can identify signs of relapse  Outcome: Progressing Towards Goal     Problem: Anxiety  Goal: *Alleviation of anxiety  Outcome: Progressing Towards Goal

## 2021-04-15 NOTE — BH NOTES
Patient has been up on the unit. Medication compliant. No side effects noted. Attended the majority of the groups. Accepted Casie Hyun  as ordered without problem IM Right Deltoid. Denies SI/HI. Denies AVH. No physical complaints voiced. Noted to walk laps around the unit at times. Preparing for discharge to crisis stabilization tomorrow. Remains on CO. Continue to monitor.

## 2021-04-15 NOTE — GROUP NOTE
[facility] GROUP DOCUMENTATION INDIVIDUAL Group Therapy Note Date: 4/15/2021 Group Start Time: 1500 Group End Time: 3084 Group Topic: Process Group - Inpatient [facility] Group Ignacio Tyson [facility] GROUP DOCUMENTATION GROUP Group Therapy Note Attendees: All pts were encouraged to attend however only 5 out of 11 attended. The pts were asked to introduce themselves and then to state something positive that has happened to them recently. We then discussed coping mechanisms and passed around the positivity ball. Attendance: Attended Patient's Goal:  To attend groups and activities Interventions/techniques: Challenged, Informed, Validated, Promoted peer support, Provide feedback and Supported Follows Directions: Followed directions Interactions: Interacted appropriately Mental Status: Calm, Congruent and Flat Behavior/appearance: Attentive, Cooperative and Motivated Goals Achieved: Able to engage in interactions, Able to listen to others, Able to reflect/comment on own behavior, Able to receive feedback, Able to self-disclose, Discussed coping, Identified feelings, Increased hopefulness and Verbalized increased hopefulness Additional Notes:  Pt identified discharging either tomorrow or Monday as positive as well as getting the right meds and housing. He asked for advice on developing coping skills. We talked about cooking, painting, and drawing. We also talked about writing them down and then the next step is to utilize them. During the activity he spoke about self-esteem, structure, believing in self, and gratitude. Biju Kearney

## 2021-04-16 VITALS
SYSTOLIC BLOOD PRESSURE: 116 MMHG | TEMPERATURE: 97.3 F | HEART RATE: 91 BPM | WEIGHT: 240 LBS | DIASTOLIC BLOOD PRESSURE: 75 MMHG | RESPIRATION RATE: 18 BRPM | BODY MASS INDEX: 29.22 KG/M2 | OXYGEN SATURATION: 97 % | HEIGHT: 76 IN

## 2021-04-16 PROCEDURE — 74011250637 HC RX REV CODE- 250/637: Performed by: PSYCHIATRY & NEUROLOGY

## 2021-04-16 RX ORDER — ARIPIPRAZOLE 5 MG/1
5 TABLET ORAL DAILY
Qty: 30 TAB | Refills: 0 | Status: SHIPPED | OUTPATIENT
Start: 2021-04-17

## 2021-04-16 RX ORDER — FLUVOXAMINE MALEATE 50 MG/1
50 TABLET ORAL
Qty: 30 TAB | Refills: 0 | Status: SHIPPED | OUTPATIENT
Start: 2021-04-16

## 2021-04-16 RX ORDER — HYDROXYZINE 25 MG/1
25 TABLET, FILM COATED ORAL
Qty: 30 TAB | Refills: 1 | Status: SHIPPED | OUTPATIENT
Start: 2021-04-16 | End: 2021-04-26

## 2021-04-16 RX ORDER — PRAZOSIN HYDROCHLORIDE 1 MG/1
1 CAPSULE ORAL
Qty: 30 CAP | Refills: 0 | Status: SHIPPED | OUTPATIENT
Start: 2021-04-16

## 2021-04-16 RX ORDER — ESCITALOPRAM OXALATE 20 MG/1
20 TABLET ORAL DAILY
Qty: 30 TAB | Refills: 0 | Status: SHIPPED | OUTPATIENT
Start: 2021-04-16

## 2021-04-16 RX ORDER — BUSPIRONE HYDROCHLORIDE 7.5 MG/1
7.5 TABLET ORAL 2 TIMES DAILY
Qty: 60 TAB | Refills: 0 | Status: SHIPPED | OUTPATIENT
Start: 2021-04-16

## 2021-04-16 RX ORDER — TRAZODONE HYDROCHLORIDE 50 MG/1
50 TABLET ORAL
Qty: 30 TAB | Refills: 0 | Status: SHIPPED | OUTPATIENT
Start: 2021-04-16

## 2021-04-16 RX ADMIN — ESCITALOPRAM OXALATE 20 MG: 10 TABLET ORAL at 08:43

## 2021-04-16 RX ADMIN — ARIPIPRAZOLE 5 MG: 5 TABLET ORAL at 08:43

## 2021-04-16 RX ADMIN — BUSPIRONE HYDROCHLORIDE 7.5 MG: 5 TABLET ORAL at 08:43

## 2021-04-16 NOTE — PROGRESS NOTES
Problem: Suicide  Goal: *STG: Remains safe in hospital  Outcome: Progressing Towards Goal     Patient has been safe so far this shift. Patient remains on close observation. Patient has been alert, oriented, cooperative and pleasant on approach. Patient is slightly withdrawn to himself. Patient said he is \"good. \"  He said he is \"nervous\" due to his next placement. He said he is \"a little excited. \"  He denied any depression, SI or HI. He rated his anxiety as 2-3/10. His initial VSs were abnormal of 95/75 with a pulse of 126. Later his VSs were 128/78 with a pulse of 78. Patient did not complain of any SOB, dizziness, light-headedness, etc.  Patient said he had been drinking fluids throughout the day. Medication compliant. Continue to assess. Since going to bed, patient has been laying down quietly with his eyes closed.

## 2021-04-16 NOTE — BH NOTES
DISCHARGE SUMMARY    NAME:Moustapha Young  : 1995  MRN: [id number]    The patient Mariam Jiang exhibits the ability to control behavior in a less restrictive environment. Patient's level of functioning is improving. No assaultive/destructive behavior has been observed for the past 24 hours. No suicidal/homicidal threat or behavior has been observed for the past 24 hours. There is no evidence of serious medication side effects. Patient has not been in physical or protective restraints for at least the past 24 hours. If weapons involved, how are they secured? The patient denied access to firearms. Is patient aware of and in agreement with discharge plan? YEs    Arrangements for medication:  Prescriptions given to patient, given a weeks supply or 30 day supply. Copy of discharge instructions to provider?:  Yes    Arrangements for transportation home:  The patient will be picked up by a crisis stabilization counselor from [facility]. Keep all follow up appointments as scheduled, continue to take prescribed medications per physician instructions.   Mental health crisis number:  007 or your local mental health crisis line number at [address] [facility] Emergency WARM LINE      9-706-992-MHAV (9268)      M-F: 9am to 9pm      Sat & Sun: 5pm  9pm  National suicide prevention lines:                             2-809-ZBAWVLH (9-516-689-740-824-0851)       5-607-539-TALK (1-932-767-482-914-7863)    Crisis Text Line:  Text HOME to 370448

## 2021-04-16 NOTE — SUICIDE SAFETY PLAN
SAFETY PLAN    A suicide Safety Plan is a document that supports someone when they are having thoughts of suicide. Warning Signs that indicate a suicidal crisis may be developing: What (situations, thoughts, feelings, body sensations, behaviors, etc.) do you experience that lets you know you are beginning to think about suicide? 1. Depression, triggers, past reflection  2. Angry, frustrating with situation  3. Anxious, twitching, low motivation    Internal Coping Strategies:  What things can I do (relaxation techniques, hobbies, physical activities, etc.) to take my mind off my problems without contacting another person? 1. Meditation. Miniature painting, video games  2. Exercise, music  3. Reading/writing    People and social settings that provide distraction: Who can I call or where can I go to distract me? 1. Name: [employer]   Phone: number in phone  2. Name: Lisy Daniels  Phone: [phone]   3. Place: Destination [facility]           4. Place: Restaurant/hangouts/ video games     People whom I can ask for help: Who can I call when I need help - for example, friends, family, clergy, someone else? 1. Name: [employer]                 Phone: number in phone  2. Name: King Rachel  Phone: [phone]  3. Name: Mary Stanton  Phone: [phone]    Professionals or [address] [place] [address] I can contact during a crisis: Who can I call for help - for example, my doctor, my psychiatrist, my psychologist, a mental health provider, a suicide hotline? 1. Clinician Name: [facility]   Phone: [phone]      Clinician Pager or Emergency Contact #:     2. Clinician Name [facility]   Phone: number in phone      Clinician Pager or Emergency Contact #:     3. Suicide Prevention Lifeline: 1-091-674-TALK (4838)    4.  [address] [place] [address] Emergency Services -  for example, [facility] suicide hotline, [facility] Hotline: XJOOBMGS-36 Emergency Services Address: [facility], [address], [place], [place] [address]      Emergency Services Phone: [phone]    Making the environment safe: How can I make my environment (house/apartment/living space) safer? For example, can I remove guns, medications, and other items? 1. Remove Alcohol  2.  Remove Junk/add tools, dishes,  etc

## 2021-04-16 NOTE — BH NOTES
NURSING DISCHARGE NOTE    Discharged to home. Affect full. Denied feeling depressed, anxious, and/or suicidal. Written Rx and f/u info given/explained; verbalized he understood. Took all personal belongings and valuables. Escorted down to ride, by writer.

## 2021-04-16 NOTE — GROUP NOTE
[facility] GROUP DOCUMENTATION INDIVIDUAL Group Therapy Note Date: 4/16/2021 Group Start Time: 1100 Group End Time: 0219 Group Topic: Education Group - Inpatient [facility] Group Jameson Puri [facility] GROUP DOCUMENTATION GROUP Group Therapy Note Attendees: All pts were encouraged to attend but only 10 out of 14 came. The topic was deep breathing. As a group we read through the worksheet and talked about what deep breathing was and what its benefits are. We then practiced deep breathing as a group and the pts discussed how they felt afterwards. Attendance: Attended Patient's Goal:  To attend groups and activities Interventions/techniques: Challenged, Informed, Validated, Promoted peer support, Provide feedback and Supported Follows Directions: Followed directions Interactions: Interacted appropriately Mental Status: Calm, Congruent and Flat Behavior/appearance: Attentive and Cooperative Goals Achieved: Able to engage in interactions, Able to listen to others, Able to reflect/comment on own behavior, Able to self-disclose and Identified feelings Additional Notes:  Pt reported feeling anxious and excited to leave today. He gave some feedback to his peers. Laurel Lorenz

## 2021-04-18 NOTE — DISCHARGE SUMMARY
Mike Gomez 23 SUMMARY    Name:  Yury Deutsch  MR#:  [id number]  :  1995  ACCOUNT #:  [de-identified]  ADMIT DATE:  2021  DISCHARGE DATE:  2021    Please make reference to my initial Irene and THANH. This is a 23-year-old single  male patient admitted to [facility] Unit from [facility] ED, brought by the family. CHIEF COMPLAINT:  Suicidal ideation, homicidal ideation. HISTORY OF PRESENT ILLNESS:  This is the patient living with a support, a long history of , admitted for depression, suicidal thought, homicidal thought, intrusive thoughts. Claims to be taking Lexapro 20 mg, Abilify 2 mg, Risperdal unknown amount, BuSpar 7.5 mg, and trazodone, and apparently stopped taking medication and the family asked him to be on medication because they are getting frustrated about his life, noncompliant and suicidal and homicidal thoughts. Surprisingly, by second and third day, he stopped having suicidal thoughts, homicidal thoughts, intrusive thought. The aunt also told later in the family session that he is preoccupied with the porn. They basically felt he could not come back. The patient participated in individual therapy, group therapy, medication management, compliant with medication, polite, cooperative. No suicidal thought. No homicidal thought. No aggressive behavior. He took the medications regularly, addressed that. He wanted Disability help, applying for it. He could not go back to his aunt's place.  is working in terms of stepping down to family crisis stabilization program.  He also was willing to take Abilify Maintena 400 mg of depot injection which he took on 04/15 and he is being discharged. the patient, no psychosis and even commented he would not hurt anybody. LABORATORY DATA:  WBC unremarkable. Metabolic panel unremarkable. Urinalysis unremarkable. Drug screen negative. Alcohol less than 4. Tylenol, salicylate subtherapeutic. COVID not detected. DISCHARGE DIAGNOSES:  Bipolar disorder, mixed, with suicidal and homicidal ideation; posttraumatic stress disorder. No weapons. DISCHARGE MEDICATIONS:  Abilify Maintena 400 mg given on 04/15/2021, prazosin 1 mg at night, hydroxyzine 25 mg t.i.d. p.r.n., Luvox 50 mg at night, Abilify 5 mg at night, trazodone 50 mg, Lexapro 20 mg, buspirone 7.5 mg b.i.d. Followup with crisis stabilization.         Javi Higginbotham MD      [id number]  D:  04/17/2021 22:39  T:  04/18/2021 6:08  JOB #:  [id number]

## 2022-03-18 PROBLEM — F41.8 DEPRESSION WITH ANXIETY: Status: ACTIVE | Noted: 2021-04-08

## 2022-03-19 PROBLEM — R45.851 SUICIDAL IDEATION: Status: ACTIVE | Noted: 2021-04-08

## 2023-05-15 RX ORDER — PRAZOSIN HYDROCHLORIDE 1 MG/1
1 CAPSULE ORAL
COMMUNITY
Start: 2021-04-16

## 2023-05-15 RX ORDER — FLUVOXAMINE MALEATE 50 MG/1
50 TABLET, COATED ORAL
COMMUNITY
Start: 2021-04-16

## 2023-05-15 RX ORDER — ESCITALOPRAM OXALATE 20 MG/1
20 TABLET ORAL DAILY
COMMUNITY
Start: 2021-04-16

## 2023-05-15 RX ORDER — BUSPIRONE HYDROCHLORIDE 7.5 MG/1
7.5 TABLET ORAL 2 TIMES DAILY
COMMUNITY
Start: 2021-04-16

## 2023-05-15 RX ORDER — ARIPIPRAZOLE 5 MG/1
5 TABLET ORAL DAILY
COMMUNITY
Start: 2021-04-17

## 2023-05-15 RX ORDER — TRAZODONE HYDROCHLORIDE 50 MG/1
50 TABLET ORAL
COMMUNITY
Start: 2021-04-16